# Patient Record
Sex: FEMALE | Race: WHITE
[De-identification: names, ages, dates, MRNs, and addresses within clinical notes are randomized per-mention and may not be internally consistent; named-entity substitution may affect disease eponyms.]

---

## 2018-01-23 ENCOUNTER — HOSPITAL ENCOUNTER (OUTPATIENT)
Dept: HOSPITAL 50 - VM.ED | Age: 63
Setting detail: OBSERVATION
LOS: 2 days | Discharge: NURSING, REHAB OR LONG-TERM CARE | End: 2018-01-25
Attending: NURSE PRACTITIONER | Admitting: NURSE PRACTITIONER
Payer: MEDICARE

## 2018-01-23 DIAGNOSIS — R56.9: ICD-10-CM

## 2018-01-23 DIAGNOSIS — Z87.820: ICD-10-CM

## 2018-01-23 DIAGNOSIS — R25.2: ICD-10-CM

## 2018-01-23 DIAGNOSIS — Z88.5: ICD-10-CM

## 2018-01-23 DIAGNOSIS — Z88.0: ICD-10-CM

## 2018-01-23 DIAGNOSIS — E78.00: ICD-10-CM

## 2018-01-23 DIAGNOSIS — F41.9: ICD-10-CM

## 2018-01-23 DIAGNOSIS — G89.29: ICD-10-CM

## 2018-01-23 DIAGNOSIS — M54.9: ICD-10-CM

## 2018-01-23 DIAGNOSIS — Z98.890: ICD-10-CM

## 2018-01-23 DIAGNOSIS — Z79.899: ICD-10-CM

## 2018-01-23 DIAGNOSIS — E03.9: ICD-10-CM

## 2018-01-23 DIAGNOSIS — K52.9: ICD-10-CM

## 2018-01-23 DIAGNOSIS — E86.0: Primary | ICD-10-CM

## 2018-01-23 DIAGNOSIS — Z79.810: ICD-10-CM

## 2018-01-23 LAB
CHLORIDE SERPL-SCNC: 96 MMOL/L (ref 98–107)
SODIUM SERPL-SCNC: 134 MMOL/L (ref 136–145)

## 2018-01-23 PROCEDURE — 85610 PROTHROMBIN TIME: CPT

## 2018-01-23 PROCEDURE — 83605 ASSAY OF LACTIC ACID: CPT

## 2018-01-23 PROCEDURE — G0378 HOSPITAL OBSERVATION PER HR: HCPCS

## 2018-01-23 PROCEDURE — 73060 X-RAY EXAM OF HUMERUS: CPT

## 2018-01-23 PROCEDURE — 96361 HYDRATE IV INFUSION ADD-ON: CPT

## 2018-01-23 PROCEDURE — 97535 SELF CARE MNGMENT TRAINING: CPT

## 2018-01-23 PROCEDURE — 84484 ASSAY OF TROPONIN QUANT: CPT

## 2018-01-23 PROCEDURE — 80048 BASIC METABOLIC PNL TOTAL CA: CPT

## 2018-01-23 PROCEDURE — 73502 X-RAY EXAM HIP UNI 2-3 VIEWS: CPT

## 2018-01-23 PROCEDURE — 70450 CT HEAD/BRAIN W/O DYE: CPT

## 2018-01-23 PROCEDURE — 99285 EMERGENCY DEPT VISIT HI MDM: CPT

## 2018-01-23 PROCEDURE — 97165 OT EVAL LOW COMPLEX 30 MIN: CPT

## 2018-01-23 PROCEDURE — 97161 PT EVAL LOW COMPLEX 20 MIN: CPT

## 2018-01-23 PROCEDURE — 85025 COMPLETE CBC W/AUTO DIFF WBC: CPT

## 2018-01-23 PROCEDURE — 86140 C-REACTIVE PROTEIN: CPT

## 2018-01-23 PROCEDURE — 80053 COMPREHEN METABOLIC PANEL: CPT

## 2018-01-23 PROCEDURE — 36415 COLL VENOUS BLD VENIPUNCTURE: CPT

## 2018-01-23 PROCEDURE — 96374 THER/PROPH/DIAG INJ IV PUSH: CPT

## 2018-01-23 PROCEDURE — 81001 URINALYSIS AUTO W/SCOPE: CPT

## 2018-01-23 NOTE — EDM.PDOC
ED HPI GENERAL MEDICAL PROBLEM





- General


Chief Complaint: Abdominal Pain


Stated Complaint: nausea, vomiting, weakness


Time Seen by Provider: 01/23/18 10:46


Source of Information: Reports: Patient, Other


History Limitations: Reports: Altered Mental Status





- History of Present Illness


INITIAL COMMENTS - FREE TEXT/NARRATIVE: 


Patient is resident of the [facility].  

Her staff that are here relay that over the last month her depression 

medications have been altered.  Dr. Fair is managing her medications.  She was 

exhibiting increased depression and physical aggression and he began to wean 

her from her zoloft while adding lexapro.  Once off the zoloft and on full dose 

of lexapro she was worse behaviorally and so he started her back on full dose 

of zoloft, taken off her lexapro and started on abilify.  She was started on 

abilify 5 days ago.  She became ill with nausea and vomiting so he did stop the 

abilify on Monday after she had already taken it.  She did not receive this 

medication today.  Staff noticed significant vomiting yesterday that did 

improve throughout the day, however she became more weakened and her ability 

for independent ADL's decreased.  They do note they questioned a possible TIA.  

She was not brought in yesterday.  She denies headache, chest pain, SOB, blood 

in urine or stool.  Is dry heaving today.  Staff state that she is very 

sensitive to any med changes and does have chronic back pain.


Onset: Gradual


Onset Date: 01/22/18


Duration: Intermittent


Location: Reports: Abdomen


Associated Symptoms: Reports: Nausea/Vomiting





- Related Data


 Allergies











Allergy/AdvReac Type Severity Reaction Status Date / Time


 


codeine Allergy Unknown unknown Verified 03/27/16 12:16


 


Penicillins Allergy Unknown unknown Verified 03/27/16 12:16











Home Meds: 


 Home Meds





LORazepam 1 mg PO ASDIRECTED PRN 03/23/14 [History]


Levothyroxine 175 mcg PO ACBREAKFAST 03/23/14 [History]


Multivitamin/Iron/Folic Acid [Sentry Tablet] 1 each PO DAILY 03/23/14 [History]


atorvaSTATin [Lipitor] 10 mg PO BEDTIME 03/23/14 [History]


lamoTRIgine [Lamictal] 300 mg PO BID 02/05/16 [History]


Lacosamide [Vimpat] 200 mg PO BID 03/30/16 [Rx]


ARIPiprazole [Aripiprazole] 2 mg PO DAILY 01/23/18 [History]


Calcium Carbonate/Vitamin D3 [Calcium 600 + Vit D 200] 1 tab PO BID 01/23/18 [

History]


Raloxifene [Evista] 60 mg PO DAILY 01/23/18 [History]


Ranitidine [Zantac] 75 mg PO DAILY 01/23/18 [History]


Sertraline HCl [Zoloft] 250 mg PO DAILY 01/23/18 [History]











Past Medical History


Cardiovascular History: Reports: High Cholesterol


Musculoskeletal History: Reports: Other (See Below)


Other Musculoskeletal History: right arm spascity due to accident


Neurological History: Reports: Brain Injury, Seizure


Psychiatric History: Reports: Anxiety, Other (See Below)


Other Psychiatric History: brain injury


Endocrine/Metabolic History: Reports: Hypothyroidism





Social & Family History





- Family History


Family Medical History: Noncontributory





- Tobacco Use


Smoking Status *Q: Unknown Ever Smoked


Years of Tobacco use: 20


Second Hand Smoke Exposure: No





- Alcohol Use


Days Per Week of Alcohol Use: 0





- Recreational Drug Use


Recreational Drug Use: No





ED ROS GENERAL





- Review of Systems


Review Of Systems: See Below


Constitutional: Reports: Weakness


HEENT: Reports: No Symptoms


Respiratory: Reports: No Symptoms


Cardiovascular: Reports: No Symptoms


Endocrine: Reports: No Symptoms


GI/Abdominal: Reports: Abdominal Pain, Nausea, Vomiting


: Reports: No Symptoms


Musculoskeletal: Reports: Back Pain


Skin: Reports: No Symptoms


Neurological: Reports: No Symptoms


Psychiatric: Reports: No Symptoms


Hematologic/Lymphatic: Reports: No Symptoms


Immunologic: Reports: No Symptoms





ED EXAM, GI/ABD





- Physical Exam


Exam: See Below


Exam Limited By: Altered Mental Status


General Appearance: Alert, Lethargic


Eyes: Bilateral: EOMI


Ears: Normal TMs


Nose: Normal Inspection, Normal Mucosa, No Blood


Throat/Mouth: Normal Inspection, Normal Lips, Normal Teeth, Normal Gums, Normal 

Oropharynx, Normal Voice, No Airway Compromise


Head: Atraumatic, Normocephalic, Other (evidence of history of craniotomy)


Neck: Normal Inspection, Supple, Non-Tender, Full Range of Motion


Respiratory/Chest: No Respiratory Distress, Lungs Clear, Normal Breath Sounds, 

No Accessory Muscle Use, Chest Non-Tender


Cardiovascular: Normal Peripheral Pulses, Regular Rate, Rhythm, No Edema, No 

Gallop, No JVD, No Murmur, No Rub


GI/Abdominal Exam: Normal Bowel Sounds, Soft, Non-Tender, No Organomegaly, No 

Distention, No Abnormal Bruit, No Mass, Pelvis Stable


Back Exam: Normal Inspection, Full Range of Motion, NT


Extremities: Normal Inspection, Normal Range of Motion, Non-Tender, No Pedal 

Edema, Normal Capillary Refill, Other (she does favor to right side)


Neurological: Alert, No Motor/Sensory Deficits, Memory Loss Recent Events.  No: 

Oriented


Psychiatric: Flat Affect


Skin Exam: Warm, Dry, Intact, No Rash, Ecchymosis (has had recent multiple 

falls with multiple areas of ecchymosis to right side back, shoulder, leg)


Lymphatic: No Adenopathy





Course





- Vital Signs


Last Recorded V/S: 





 Last Vital Signs











Temp  36.5 C   01/23/18 13:29


 


Pulse  68   01/23/18 13:29


 


Resp  18   01/23/18 13:29


 


BP  92/54 L  01/23/18 13:29


 


Pulse Ox  97   01/23/18 13:29














- Orders/Labs/Meds


Orders: 





 Active Orders 24 hr











 Category Date Time Status


 


 Head wo Cont [CT] Stat Exams  01/23/18 10:50 Ordered


 


 Sodium Chloride 0.9% [Saline Flush] Med  01/23/18 10:50 Ordered





 10 ml FLUSH ASDIRECTED PRN   


 


 Saline Lock Insert [OM.PC] Routine Oth  01/23/18 10:50 Ordered








 Medication Orders





Sodium Chloride (Normal Saline)  1,000 mls @ 100 mls/hr IV ASDIRECTED ROMMEL


Ondansetron HCl (Zofran Odt)  4 mg PO Q6H PRN


   PRN Reason: nausea, able to take PO


Sodium Chloride (Saline Flush)  10 ml FLUSH ASDIRECTED PRN


   PRN Reason: Keep Vein Open








Labs: 





 Laboratory Tests











  01/23/18 01/23/18 01/23/18 Range/Units





  11:35 11:35 11:35 


 


WBC  8.0    (4.0-10.0)  x10^3/uL


 


RBC  3.69 L    (4.00-5.50)  x10^6/uL


 


Hgb  11.1 L    (12.0-16.0)  g/dL


 


Hct  32.7 L    (33.0-47.0)  %


 


MCV  88.6    (78.0-93.0)  fL


 


MCH  30.1    (26.0-32.0)  pg


 


MCHC  33.9    (32.0-36.0)  g/dL


 


RDW Coeff of Pepe  13.5    (10.0-15.0)  %


 


Plt Count  327  D    (130-400)  x10^3/uL


 


Neut % (Auto)  71.2    (50.0-80.0)  %


 


Lymph % (Auto)  17.9 L    (25.0-50.0)  %


 


Mono % (Auto)  9.8    (2.0-11.0)  %


 


Eos % (Auto)  0.5    (0.0-4.0)  %


 


Baso % (Auto)  0.6    (0.2-1.2)  %


 


PT   10.1   (9.8-11.8)  SEC


 


INR   0.9 L   (2.0-3.5)  


 


Sodium    134 L  (136-145)  mmol/L


 


Potassium    3.5  (3.5-5.1)  mmol/L


 


Chloride    96 L  ()  mmol/L


 


Carbon Dioxide    28  (21-32)  mmol/L


 


BUN    15  (7-18)  mg/dL


 


Creatinine    0.9  (0.55-1.02)  mg/dL


 


Est Cr Clr Drug Dosing    TNP  


 


Estimated GFR (MDRD)    > 60  


 


Glucose    74  ()  mg/dL


 


Lactic Acid     (0.4-2.0)  mmol/L


 


Calcium    8.3 L  (8.5-10.1)  mg/dL


 


Corrected Calcium    8.70  (8.5-10.1)  mg/dL


 


Total Bilirubin    0.5  (0.2-1.0)  mg/dL


 


AST    58 H  (15-37)  U/L


 


ALT    34  (14-59)  U/L


 


Alkaline Phosphatase    97  ()  U/L


 


Troponin I    < 0.017  (<=0.056)  ng/mL


 


C-Reactive Protein    6.4 H  (<=0.9)  mg/dL


 


Total Protein    7.0  (6.4-8.2)  g/dL


 


Albumin    3.5  (3.4-5.0)  g/dL


 


Globulin    3.5  


 


Albumin/Globulin Ratio    1.00  


 


Urine Color     (YELLOW)  


 


Urine Appearance     (CLEAR)  


 


Urine pH     (5.0-8.0)  


 


Ur Specific Gravity     


 


Urine Protein     (NEGATIVE)  mg/dL


 


Urine Glucose (UA)     (NEGATIVE)  mg/dL


 


Urine Ketones     (NEGATIVE)  mg/dL


 


Urine Occult Blood     (NEGATIVE)  


 


Urine Nitrite     (NEGATIVE)  


 


Urine Bilirubin     (NEGATIVE)  


 


Urine Urobilinogen     (0.2)  EU/dL


 


Ur Leukocyte Esterase     (NEGATIVE)  


 


Urine RBC     (NOT SEEN)  /HPF


 


Urine WBC     (NOT SEEN)  /HPF


 


Ur Squamous Epith Cells     (NEGATIVE)  /HPF


 


Urine Bacteria     (NEGATIVE)  /HPF


 


Urine Mucus     (NEGATIVE)  /LPF














  01/23/18 01/23/18 Range/Units





  11:35 12:05 


 


WBC    (4.0-10.0)  x10^3/uL


 


RBC    (4.00-5.50)  x10^6/uL


 


Hgb    (12.0-16.0)  g/dL


 


Hct    (33.0-47.0)  %


 


MCV    (78.0-93.0)  fL


 


MCH    (26.0-32.0)  pg


 


MCHC    (32.0-36.0)  g/dL


 


RDW Coeff of Pepe    (10.0-15.0)  %


 


Plt Count    (130-400)  x10^3/uL


 


Neut % (Auto)    (50.0-80.0)  %


 


Lymph % (Auto)    (25.0-50.0)  %


 


Mono % (Auto)    (2.0-11.0)  %


 


Eos % (Auto)    (0.0-4.0)  %


 


Baso % (Auto)    (0.2-1.2)  %


 


PT    (9.8-11.8)  SEC


 


INR    (2.0-3.5)  


 


Sodium    (136-145)  mmol/L


 


Potassium    (3.5-5.1)  mmol/L


 


Chloride    ()  mmol/L


 


Carbon Dioxide    (21-32)  mmol/L


 


BUN    (7-18)  mg/dL


 


Creatinine    (0.55-1.02)  mg/dL


 


Est Cr Clr Drug Dosing    


 


Estimated GFR (MDRD)    


 


Glucose    ()  mg/dL


 


Lactic Acid  0.9   (0.4-2.0)  mmol/L


 


Calcium    (8.5-10.1)  mg/dL


 


Corrected Calcium    (8.5-10.1)  mg/dL


 


Total Bilirubin    (0.2-1.0)  mg/dL


 


AST    (15-37)  U/L


 


ALT    (14-59)  U/L


 


Alkaline Phosphatase    ()  U/L


 


Troponin I    (<=0.056)  ng/mL


 


C-Reactive Protein    (<=0.9)  mg/dL


 


Total Protein    (6.4-8.2)  g/dL


 


Albumin    (3.4-5.0)  g/dL


 


Globulin    


 


Albumin/Globulin Ratio    


 


Urine Color   Dark yellow H  (YELLOW)  


 


Urine Appearance   Clear  (CLEAR)  


 


Urine pH   5.5  (5.0-8.0)  


 


Ur Specific Gravity   >=1.030  


 


Urine Protein   30 H  (NEGATIVE)  mg/dL


 


Urine Glucose (UA)   Negative  (NEGATIVE)  mg/dL


 


Urine Ketones   15 H  (NEGATIVE)  mg/dL


 


Urine Occult Blood   Negative  (NEGATIVE)  


 


Urine Nitrite   Negative  (NEGATIVE)  


 


Urine Bilirubin   Small H  (NEGATIVE)  


 


Urine Urobilinogen   0.2  (0.2)  EU/dL


 


Ur Leukocyte Esterase   Negative  (NEGATIVE)  


 


Urine RBC   0-5  (NOT SEEN)  /HPF


 


Urine WBC   0-5  (NOT SEEN)  /HPF


 


Ur Squamous Epith Cells   Few H  (NEGATIVE)  /HPF


 


Urine Bacteria   Rare  (NEGATIVE)  /HPF


 


Urine Mucus   Moderate H  (NEGATIVE)  /LPF











Meds: 





Medications











Generic Name Dose Route Start Last Admin





  Trade Name Freq  PRN Reason Stop Dose Admin


 


Sodium Chloride  1,000 mls @ 100 mls/hr  01/23/18 14:30  





  Normal Saline  IV   





  ASDIRECTED ROMMEL   


 


Ondansetron HCl  4 mg  01/23/18 13:51  





  Zofran Odt  PO   





  Q6H PRN   





  nausea, able to take PO   


 


Sodium Chloride  10 ml  01/23/18 10:50  





  Saline Flush  FLUSH   





  ASDIRECTED PRN   





  Keep Vein Open   














Discontinued Medications














Generic Name Dose Route Start Last Admin





  Trade Name Freq  PRN Reason Stop Dose Admin


 


Sodium Chloride  1,000 mls @ 999 mls/hr  01/23/18 10:50  01/23/18 11:37





  Normal Saline  IV  01/23/18 11:50  999 mls/hr





  ONETIME ONE   Administration


 


Ondansetron HCl  4 mg  01/23/18 10:50  01/23/18 11:37





  Zofran  IVPUSH  01/23/18 10:51  4 mg





  ONETIME ONE   Administration














- Re-Assessments/Exams


Free Text/Narrative Re-Assessment/Exam: 





01/23/18 14:30


CT of the head does not show any indication of acute process such as ischemia 

or hemorrhage


Free Text/Narrative Re-Assessment/Exam: 





01/23/18 14:33


PLEASE USE ED HISTORY AND PHYSICAL FOR ADMISSION H & P





Departure





- Departure


Time of Disposition: 13:00


Disposition: Refer to Observation


Condition: Good


Clinical Impression: 


 Gastroenteritis, Vomiting, Dehydration, Medication course changed








- Discharge Information





ED Communication





- Discussed Case With (1)


Discussed Case With (1): Other (Visited with [facility] on-call provider Dr. Harding.  Recommend admission to observation)





- Problem List & Annotations


(1) Dehydration


SNOMED Code(s): 30177331


   Code(s): E86.0 - DEHYDRATION   Status: Acute   Current Visit: Yes   





(2) Gastroenteritis


SNOMED Code(s): 52428551


   Code(s): K52.9 - NONINFECTIVE GASTROENTERITIS AND COLITIS, UNSPECIFIED   

Status: Acute   Current Visit: Yes   





(3) Vomiting


SNOMED Code(s): 136858861


   Code(s): R11.10 - VOMITING, UNSPECIFIED   Status: Acute   Current Visit: Yes

   


Qualifiers: 


   Vomiting Intractability: non-intractable   Nausea presence: with nausea 





- Problem List Review


Problem List Initiated/Reviewed/Updated: Yes





- My Orders


Last 24 Hours: 





My Active Orders





01/23/18 10:50


Head wo Cont [CT] Stat 


Sodium Chloride 0.9% [Saline Flush]   10 ml FLUSH ASDIRECTED PRN 


Saline Lock Insert [OM.PC] Routine 














- Assessment/Plan


Last 24 Hours: 





My Active Orders





01/23/18 10:50


Head wo Cont [CT] Stat 


Sodium Chloride 0.9% [Saline Flush]   10 ml FLUSH ASDIRECTED PRN 


Saline Lock Insert [OM.PC] Routine 











Assessment:: 





dehydration


gastroenteritis


medication change reaction

## 2018-01-24 LAB
CHLORIDE SERPL-SCNC: 104 MMOL/L (ref 98–107)
SODIUM SERPL-SCNC: 140 MMOL/L (ref 136–145)

## 2018-01-24 RX ADMIN — Medication SCH MG: at 07:52

## 2018-01-24 RX ADMIN — MAGNESIUM OXIDE TAB 400 MG (241.3 MG ELEMENTAL MG) SCH TAB: 400 (241.3 MG) TAB at 07:53

## 2018-01-24 NOTE — PCM.PN
- General Info


Date of Service: 01/24/18


Admission Dx/Problem (Free Text): 





Pt. states that she is feeling better since she was admitted. She is tolerating 

fluids but continues to be quite weak, and is an assist of 2 when getting up to 

the toilet. She states that she is not experiencing any abdominal pain, fever 

or chills.


Functional Status: Reports: Pain Controlled





- Review of Systems


General: Reports: Weakness


HEENT: Reports: No Symptoms


Pulmonary: Reports: No Symptoms


Cardiovascular: Reports: No Symptoms


Gastrointestinal: Reports: Other (nausea and vomiting have improved.)


Genitourinary: Reports: No Symptoms


Musculoskeletal: Reports: No Symptoms


Skin: Reports: No Symptoms


Neurological: Reports: No Symptoms


Psychiatric: Reports: No Symptoms





- Patient Data


Vitals - Most Recent: 


 Last Vital Signs











Temp  36.3 C   01/24/18 06:00


 


Pulse  70   01/24/18 06:00


 


Resp  18   01/24/18 06:00


 


BP  96/56 L  01/24/18 06:00


 


Pulse Ox  95   01/24/18 06:00











Weight - Most Recent: 61.779 kg


I&O - Last 24 Hours: 


 Intake & Output











 01/23/18 01/24/18 01/24/18





 22:59 06:59 14:59


 


Intake Total 120 900 0


 


Balance 120 900 0











Lab Results Last 24 Hours: 


 Laboratory Results - last 24 hr











  01/24/18 01/24/18 Range/Units





  06:51 06:51 


 


WBC  7.2   (4.0-10.0)  x10^3/uL


 


RBC  3.55 L   (4.00-5.50)  x10^6/uL


 


Hgb  10.7 L   (12.0-16.0)  g/dL


 


Hct  32.4 L   (33.0-47.0)  %


 


MCV  91.3   (78.0-93.0)  fL


 


MCH  30.1   (26.0-32.0)  pg


 


MCHC  33.0   (32.0-36.0)  g/dL


 


RDW Coeff of Pepe  13.9   (10.0-15.0)  %


 


Plt Count  327   (130-400)  x10^3/uL


 


Neut % (Auto)  52.3   (50.0-80.0)  %


 


Lymph % (Auto)  35.3   (25.0-50.0)  %


 


Mono % (Auto)  8.9   (2.0-11.0)  %


 


Eos % (Auto)  2.8   (0.0-4.0)  %


 


Baso % (Auto)  0.7   (0.2-1.2)  %


 


Sodium   140  (136-145)  mmol/L


 


Potassium   4.1  (3.5-5.1)  mmol/L


 


Chloride   104  ()  mmol/L


 


Carbon Dioxide   28  (21-32)  mmol/L


 


BUN   10  (7-18)  mg/dL


 


Creatinine   1.0  (0.55-1.02)  mg/dL


 


Est Cr Clr Drug Dosing   49.49  mL/min


 


Estimated GFR (MDRD)   57  


 


Glucose   61 L  ()  mg/dL


 


Calcium   7.9 L  (8.5-10.1)  mg/dL











Med Orders - Current: 


 Current Medications





Atorvastatin Calcium (Lipitor)  10 mg PO BEDTIME [facility]


   Last Admin: 01/23/18 20:01 Dose:  10 mg


Sodium Chloride (Normal Saline)  1,000 mls @ 100 mls/hr IV ASDIRECTED [facility]


   Last Admin: 01/24/18 06:11 Dose:  100 mls/hr


Lamotrigine (Lamotrigine)  300 mg PO BID [facility]


   Last Admin: 01/24/18 07:53 Dose:  300 mg


Levothyroxine Sodium (Levothyroxine)  175 mcg PO ACBREAKFAST [facility]


   Last Admin: 01/24/18 06:15 Dose:  175 mcg


Lorazepam (Ativan)  1 mg PO ASDIRECTED PRN


   PRN Reason: Seizures


Multivitamins/Minerals (Thera M Plus)  1 tab PO DAILY [facility]


   Last Admin: 01/24/18 07:53 Dose:  1 tab


(Lacosamide [Vimpat] (200 Mg)*Pt Own Med*)  200 mg PO BID [facility]


   Last Admin: 01/24/18 07:53 Dose:  200 mg


(Raloxifene 60 Mg)* (Pt Own Med*)  60 mg PO DAILY [facility]


   Last Admin: 01/24/18 07:52 Dose:  60 mg


Ondansetron HCl (Zofran Odt)  4 mg PO Q6H PRN


   PRN Reason: nausea, able to take PO


   Last Admin: 01/23/18 20:02 Dose:  4 mg


Sertraline HCl (Zoloft)  250 mg PO DAILY [facility]


   Last Admin: 01/24/18 07:54 Dose:  250 mg


Sodium Chloride (Saline Flush)  10 ml FLUSH ASDIRECTED PRN


   PRN Reason: Keep Vein Open


   Last Admin: 01/23/18 20:03 Dose:  10 ml





Discontinued Medications





Sodium Chloride (Normal Saline)  1,000 mls @ 999 mls/hr IV ONETIME ONE


   Stop: 01/23/18 11:50


   Last Admin: 01/23/18 11:37 Dose:  999 mls/hr


Ondansetron HCl (Zofran)  4 mg IVPUSH ONETIME ONE


   Stop: 01/23/18 10:51


   Last Admin: 01/23/18 11:37 Dose:  4 mg











- Exam


General: Alert, Oriented


HEENT: Pupils Equal, Pupils Reactive, EOMI, Mucous Membr. Moist/Pink


Neck: Supple


Lungs: Clear to Auscultation, Normal Respiratory Effort


Cardiovascular: Regular Rate, Regular Rhythm


GI/Abdominal Exam: Normal Bowel Sounds, Soft, Non-Tender, No Organomegaly, No 

Distention, No Abnormal Bruit, No Mass, Pelvis Stable


Back Exam: Normal Inspection, Full Range of Motion


Extremities: Normal Inspection, Normal Range of Motion, Non-Tender, No Pedal 

Edema, Normal Capillary Refill


Skin: Warm, Dry, Intact


Neurological: No New Focal Deficit


Psy/Mental Status: Alert, Normal Affect, Normal Mood





- Problem List & Annotations


(1) Dehydration


SNOMED Code(s): 73764121


   Code(s): E86.0 - DEHYDRATION   Status: Acute   Current Visit: Yes   





(2) Gastroenteritis


SNOMED Code(s): 61696709


   Code(s): K52.9 - NONINFECTIVE GASTROENTERITIS AND COLITIS, UNSPECIFIED   

Status: Acute   Current Visit: Yes   





- Problem List Review


Problem List Initiated/Reviewed/Updated: Yes





- My Orders


Last 24 Hours: 


My Active Orders





01/24/18 09:28


Consult to  [CONS] Routine 





01/24/18 Lunch


Regular Diet [DIET] 














- Assessment


Assessment:: 





Nausea and vomiting: Gastroenteritis vs. new medication change.





- Plan


Plan:: 





Will have her followed by PT today for strengthening. I am not sure what her 

baseline is. She is very weak, and if she is not able to, we will have social 

services follow and coordinate with [facility] for placement in a different facility 

for rehabilitation.

## 2018-01-25 ENCOUNTER — HOSPITAL ENCOUNTER (INPATIENT)
Dept: HOSPITAL 50 - VM.MS | Age: 63
LOS: 18 days | Discharge: NURSING, REHAB OR LONG-TERM CARE | DRG: 948 | End: 2018-02-12
Attending: FAMILY MEDICINE | Admitting: FAMILY MEDICINE
Payer: MEDICAID

## 2018-01-25 VITALS — DIASTOLIC BLOOD PRESSURE: 69 MMHG | SYSTOLIC BLOOD PRESSURE: 130 MMHG
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
## 2018-01-25 DIAGNOSIS — Z91.81: ICD-10-CM

## 2018-01-25 DIAGNOSIS — Z88.5: ICD-10-CM

## 2018-01-25 DIAGNOSIS — Z79.899: ICD-10-CM

## 2018-01-25 DIAGNOSIS — S42.031A: ICD-10-CM

## 2018-01-25 DIAGNOSIS — M25.551: ICD-10-CM

## 2018-01-25 DIAGNOSIS — E23.0: ICD-10-CM

## 2018-01-25 DIAGNOSIS — R53.1: Primary | ICD-10-CM

## 2018-01-25 DIAGNOSIS — Z87.891: ICD-10-CM

## 2018-01-25 DIAGNOSIS — E78.5: ICD-10-CM

## 2018-01-25 DIAGNOSIS — M81.0: ICD-10-CM

## 2018-01-25 DIAGNOSIS — G40.209: ICD-10-CM

## 2018-01-25 DIAGNOSIS — S40.021D: ICD-10-CM

## 2018-01-25 DIAGNOSIS — S22.49XA: ICD-10-CM

## 2018-01-25 DIAGNOSIS — E03.9: ICD-10-CM

## 2018-01-25 DIAGNOSIS — Z88.0: ICD-10-CM

## 2018-01-25 DIAGNOSIS — F39: ICD-10-CM

## 2018-01-25 RX ADMIN — MAGNESIUM OXIDE TAB 400 MG (241.3 MG ELEMENTAL MG) SCH TAB: 400 (241.3 MG) TAB at 07:53

## 2018-01-25 RX ADMIN — Medication SCH MG: at 19:58

## 2018-01-25 RX ADMIN — Medication SCH MG: at 07:52

## 2018-01-25 RX ADMIN — Medication SCH TAB: at 20:01

## 2018-01-25 NOTE — PCM.HP
H&P History of Present Illness





- General


Date of Service: 01/25/18


Admit Problem/Dx: 


 Admission Diagnosis/Problem





Admission Diagnosis/Problem      Dehydration








Source of Information: Patient


History Limitations: Reports: Other (underlying cognitive impairments)





- History of Present Illness


Initial Comments - Free Text/Narative: 





Ms. Hernandez is a 61 yo female with chronic right sided weakness following 

resection of a pituitary macroadenoma who is admitted to swing bed for further 

management of generalized weakness. She was admitted under observation on 1/23 

due to generalized weakness and dehydration felt to be secondary to nausea and 

vomiting from some recent medication changes. She lives at the [facility] and has recently had some increased behavior issues. Therefore, her 

psychiatric medications have been adjusted. Shortly after starting abilify the 

end of last week, she developed a significant decrease in appetite as well as 

some nausea, vomiting, and fatigue. The abilify was stopped early this week and 

the GI symptoms resolved. However, she remained significantly weak. Following a 

fall, she was brought to the ER for evaluation and was subsequently admitted 

under observation for IV fluids. Although she is now eating and drinking well 

without any issues of nausea or vomiting, her strength is not back to baseline. 

She needs to be able to walk independently to return to the ran so she is 

admitted to swing bed for strengthening.





She has had some right hip pain this morning. She also has some right upper arm 

pain as well. She has bruising along the upper arm but not along the hip. The 

details of her fall are not available at the time of my interview.





- Related Data


Allergies/Adverse Reactions: 


 Allergies











Allergy/AdvReac Type Severity Reaction Status Date / Time


 


codeine Allergy Unknown unknown Verified 03/27/16 12:16


 


Penicillins Allergy Unknown unknown Verified 03/27/16 12:16











Home Medications: 


 Home Meds





LORazepam 1 mg PO ASDIRECTED PRN 03/23/14 [History]


Levothyroxine 175 mcg PO ACBREAKFAST 03/23/14 [History]


Multivitamin/Iron/Folic Acid [Sentry Tablet] 1 tab PO DAILY 03/23/14 [History]


atorvaSTATin [Lipitor] 10 mg PO BEDTIME 03/23/14 [History]


lamoTRIgine [Lamictal] 300 mg PO BID 02/05/16 [History]


Lacosamide [Vimpat] 200 mg PO BID 03/30/16 [Rx]


Calcium Carbonate/Vitamin D3 [Calcium 600 + Vit D 200] 1 tab PO BID 01/23/18 [

History]


Raloxifene [Evista] 60 mg PO DAILY 01/23/18 [History]


Ranitidine [Zantac] 75 mg PO DAILY 01/23/18 [History]


Sertraline HCl [Zoloft] 250 mg PO DAILY 01/23/18 [History]











Past Medical History


HEENT History: Reports: None


Cardiovascular History: Reports: High Cholesterol


Respiratory History: Reports: None


Gastrointestinal History: Reports: None


Genitourinary History: Reports: None


Musculoskeletal History: Reports: Osteoporosis, Other (See Below)


Other Musculoskeletal History: right arm spascity due to accident


Neurological History: Reports: Brain Injury, Seizure


Psychiatric History: Reports: Anxiety, Other (See Below)


Other Psychiatric History: brain injury


Endocrine/Metabolic History: Reports: Hypothyroidism, Other (See Below) (

pituitary macroadenoma, resected panhypopituitarism)


Hematologic History: Reports: None


Immunologic History: Reports: None


Oncologic (Cancer) History: Reports: None


Dermatologic History: Reports: None





- Infectious Disease History


Infectious Disease History: Reports: None





- Past Surgical History


HEENT Surgical History: Reports: Other (See Below) (excision of vocal cord 

nodule)


GI Surgical History: Reports: Appendectomy, Cholecystectomy





Social & Family History





- Family History


Cardiac: Reports: CAD, High Cholesterol, Hypertension





- Tobacco Use


Smoking Status *Q: Former Smoker


Years of Tobacco use: 20


Second Hand Smoke Exposure: No





- Alcohol Use


Alcohol Use History: No


Days Per Week of Alcohol Use: 0


Alcohol Use in Last Twelve Months: No





- Recreational Drug Use


Recreational Drug Use: No





- Living Situation & Occupation


Living situation: Reports: Single, Other ([place] [facility])


Occupation: Disabled





H&P Review of Systems





- Review of Systems:


Review Of Systems: See Below


General: Reports: No Symptoms


HEENT: Reports: No Symptoms


Pulmonary: Reports: No Symptoms


Cardiovascular: Reports: No Symptoms


Gastrointestinal: Reports: No Symptoms


Genitourinary: Reports: No Symptoms


Skin: Reports: No Symptoms


Psychiatric: Reports: No Symptoms


Neurological: Reports: Pre-Existing Deficit





Exam





- Exam


Exam: See Below





- Exam


General: Alert, Cooperative, Mild Distress (intermittently secondary to right 

hip pain)


HEENT: Conjunctiva Clear, Mucosa Moist & Pink, Pupils Equal, Pupils Reactive


Neck: Supple, Trachea Midline.  No: Lymphadenopathy, Thyromegaly


Lungs: Clear to Auscultation, Normal Respiratory Effort


Cardiovascular: Regular Rate, Regular Rhythm, Normal S1, Normal S2


GI/Abdominal Exam: Normal Bowel Sounds, Soft, Non-Tender, No Organomegaly, No 

Distention, No Mass


Extremities: Normal Inspection, No Pedal Edema, Other (tenderness to palpation 

anterior upper leg; no pain with passive internal and external rotation at the 

hip; no erythema, edema, or ecchymosis; right upper arm with resolving 

ecchymosis - area is mildly tender to palpation - ROM presumably at baseline)


Peripheral Pulses: 2+: Radial (L), Radial (R), Posterior Tibial (L), Posterior 

Tibial (R)


Skin: Warm, Dry, Intact





*Q Meaningful Use (ADM)





- VTE *Q


VTE Criteria *Q: 








- Stroke *Q


Stroke Criteria *Q: 








- AMI *Q


AMI Criteria *Q: 








- Problem List


(1) Generalized weakness


SNOMED Code(s): 65448081


   ICD Code: R53.1 - WEAKNESS   Status: Acute   Current Visit: Yes   Problem 

Details: - Secondary to nausea, vomiting, and dehydration. Evaluation for other 

causes appropriate and without other explanation.


- PT and OT to work with the patient.   





(2) Contusion, hip


SNOMED Code(s): 99013797


   ICD Code: S70.00XA - CONTUSION OF UNSPECIFIED HIP, INITIAL ENCOUNTER   Status

: Acute   Current Visit: Yes   Problem Details: - X-rays obtained today and 

negative.


- Will add tylenol PRN for pain.


- If unable to bear weight and/or pain is not controlled with tylenol, will 

consider further evaluation with CT scan.   


Qualifiers: 


   Encounter type: initial encounter   Laterality: right   Qualified Code(s): 

S70.01XA - Contusion of right hip, initial encounter   





(3) Arm contusion


SNOMED Code(s): 66084358


   ICD Code: S40.029A - CONTUSION OF UNSPECIFIED UPPER ARM, INITIAL ENCOUNTER   

Status: Acute   Current Visit: Yes   Problem Details: - X-rays obtained today 

and negative.


- Will monitor for any change in symptoms.   


Qualifiers: 


   Encounter type: initial encounter   Laterality: right   Qualified Code(s): 

S40.021A - Contusion of right upper arm, initial encounter   





(4) Dehydration


SNOMED Code(s): 65004598


   ICD Code: E86.0 - DEHYDRATION   Status: Resolved   Current Visit: No   

Problem Details: - Was dehydrated upon observation admission. This is now 

resolved.


- No further IV fluids.


- She will ad eric PO intake.   





(5) Complex partial seizure


SNOMED Code(s): 345640835


   ICD Code: G40.209 - LOCAL-REL SYMPTC EPI W CMPLX PRT SEIZ,NOT NTRCT,W/O STAT 

EPI   Status: Chronic   Current Visit: No   Problem Details: - No known recent 

seizures.


- Continue home medications, including PRN lorazepam.   


Qualifiers: 


   Epilepsy type: partial symptomatic   Intractability: not intractable   

Status epilepticus: without status epilepticus   Qualified Code(s): G40.209 - 

Localization-related (focal) (partial) symptomatic epilepsy and epileptic 

syndromes with complex partial seizures, not intractable, without status 

epilepticus   





(6) Hyperlipidemia


SNOMED Code(s): 24700827


   ICD Code: E78.5 - HYPERLIPIDEMIA, UNSPECIFIED   Status: Chronic   Current 

Visit: No   Problem Details: - Continue home medications.   


Qualifiers: 


   Hyperlipidemia type: unspecified   Qualified Code(s): E78.5 - Hyperlipidemia

, unspecified   





(7) Hypothyroidism


SNOMED Code(s): 99666836


   ICD Code: E03.9 - HYPOTHYROIDISM, UNSPECIFIED   Status: Chronic   Current 

Visit: No   Problem Details: - TSH ok 1/13/18.


- Continue home medications.   


Qualifiers: 


   Hypothyroidism type: acquired   Qualified Code(s): E03.9 - Hypothyroidism, 

unspecified   





(8) Mood disorder


Status: Chronic   Current Visit: No   Problem Details: - Seems to be doing fine 

off the abilify at this point.


- Continue home medications.   





(9) Osteoporosis


SNOMED Code(s): 38046097


   ICD Code: M81.0 - AGE-RELATED OSTEOPOROSIS W/O CURRENT PATHOLOGICAL FRACTURE

   Status: Chronic   Current Visit: No   Problem Details: - Continue evista.   


Qualifiers: 


   Osteoporosis type: unspecified   Presence of current pathological fracture: 

without current pathological fracture   Qualified Code(s): M81.0 - Age-related 

osteoporosis without current pathological fracture   





(10) Panhypopituitarism


SNOMED Code(s): 21320450


   ICD Code: E23.0 - HYPOPITUITARISM   Status: Chronic   Current Visit: No   

Problem Details: - On problem list but only receiving replacement for thyroid.


- Continue home medications.   


Problem List Initiated/Reviewed/Updated: Yes


Orders Last 24hrs: 


 Active Orders 24 hr











 Category Date Time Status


 


 Patient Status [ADT] Routine ADT  01/25/18 11:45 Active


 


 Antiembolic Devices [RC] PER UNIT ROUTINE Care  01/25/18 13:10 Ordered


 


 Notify Provider Vital Signs [RC] ASDIRECTED Care  01/25/18 13:09 Ordered


 


 Oxygen Therapy [RC] PRN Care  01/25/18 13:06 Ordered


 


 Up With Assistance [RC] ASDIRECTED Care  01/25/18 13:06 Ordered


 


 VTE/DVT Education [RC] PER UNIT ROUTINE Care  01/25/18 13:06 Ordered


 


 Vital Signs [RC] PER UNIT ROUTINE Care  01/25/18 13:06 Ordered


 


 OT Evaluation and Treatment [CONS] Routine Cons  01/25/18 13:06 Ordered


 


 PT Evaluation and Treatment [CONS] Routine Cons  01/25/18 13:06 Ordered


 


 Regular Diet [DIET] Diet  01/25/18 Dinner Ordered


 


 Acetaminophen [Tylenol] Med  01/25/18 13:18 Ordered





 650 mg PO Q6H PRN   


 


 Calcium Carbonate/Vitamin D3 Med  01/25/18 20:00 Ordered





 1 tab PO BID   


 


 LORazepam [Ativan] Med  01/25/18 13:11 Ordered





 1 mg PO ASDIRECTED PRN   


 


 Lacosamide [Vimpat] Med  01/25/18 20:00 Ordered





 200 mg PO BID   


 


 Levothyroxine Med  01/26/18 07:00 Ordered





 175 mcg PO ACBREAKFAST   


 


 Multivitamin/Iron/Folic Acid [Sentry Tablet] Med  01/26/18 08:00 Ordered





 1 tab PO DAILY   


 


 Raloxifene Med  01/26/18 08:00 Ordered





 60 mg PO DAILY   


 


 Ranitidine [Zantac] Med  01/26/18 08:00 Ordered





 75 mg PO DAILY   


 


 Sertraline [Zoloft] Med  01/26/18 08:00 Ordered





 250 mg PO DAILY   


 


 atorvaSTATin [Lipitor] Med  01/25/18 20:00 Ordered





 10 mg PO BEDTIME   


 


 lamoTRIgine [Lamictal] Med  01/25/18 20:00 Ordered





 300 mg PO BID   


 


 Sequential Compression Device [OM.PC] Routine Oth  01/25/18 13:06 Ordered


 


 Resuscitation Status Routine Resus Stat  01/25/18 13:06 Ordered








 Medication Orders





Acetaminophen (Tylenol)  650 mg PO Q6H PRN


   PRN Reason: Pain


Atorvastatin Calcium (Lipitor)  10 mg PO BEDTIME [facility]


Levothyroxine Sodium (Levothyroxine)  175 mcg PO ACBREAKFAST [facility]


Lorazepam (Ativan)  1 mg PO ASDIRECTED PRN


   PRN Reason: Seizures


Non-Formulary Medication (Calcium Carbonate/Vitamin D3)  1 tab PO BID [facility]


Non-Formulary Medication (Lacosamide [Vimpat])  200 mg PO BID [facility]


Non-Formulary Medication (Lamotrigine [Lamictal])  300 mg PO BID [facility]


Non-Formulary Medication (Multivitamin/Iron/Folic Acid [Sentry Tablet])  1 tab 

PO DAILY [facility]


Non-Formulary Medication (Raloxifene)  60 mg PO DAILY [facility]


Non-Formulary Medication (Ranitidine [Zantac])  75 mg PO DAILY [facility]


Sertraline HCl (Zoloft)  250 mg PO DAILY [facility]








Assessment/Plan Comment:: 





61 yo female who is admitted to swing bed for strengthening following an acute 

stay due to dehydration secondary to nausea and vomiting with medication 

changes. She is improving nicely. PT and OT consults for strengthening. 

Continue home medications. X-rays negative for pain complaints; will add 

tylenol PRN. She will have SCD's for VTE prophylaxis as she will otherwise be 

active working with PT so no need for pharmacologic VTE prophylaxis. Duration 

of stay will be up to therapy. I am not familiar with her baseline decision 

making and am unsure if she has any POA or guardian. Therefore, will make her 

full code at this time. Can be readdressed by her PCP if he is more familiar 

with her long term situation.

## 2018-01-25 NOTE — PCM.SN
- Free Text/Narrative


Note: 





Called by nursing staff regarding CT results. Patient does have fractures in 

ribs 1-4 as well as the distal clavicle fracture. Reviewed up to date and other 

resources. No further intervention is needed since she has no evidence of any 

internal pathology (mediastinal injury, pneumothorax, etc). No change to the 

plan outlined previously. Will reassess patient in the am.

## 2018-01-25 NOTE — PCM.[email or web address]
**Discharge Summary





- Hospital Course


Free Text/Narrative:: 





Pt. is being transitioned to swing bed. She is currently feeling much better 

today and appears less fatigued. She is, however, complaining of R arm pain and 

R hip pain post fall. These areas have not been imaged but will be today.





- Discharge Data


Discharge Date: 01/25/18


Discharge Disposition: DC/Tfer W/I Hosp To Swing 61


Condition: Good





- Discharge Diagnosis/Problem(s)


(1) Dehydration


SNOMED Code(s): 11160187


   ICD Code: E86.0 - DEHYDRATION   Status: Acute   Current Visit: Yes   





(2) Gastroenteritis


SNOMED Code(s): 14850457


   ICD Code: K52.9 - NONINFECTIVE GASTROENTERITIS AND COLITIS, UNSPECIFIED   

Status: Acute   Current Visit: Yes   





- Patient Summary/Data


Consults: 


 Consultations





01/23/18 17:39


PT Evaluation and Treatment [CONS] Routine 





01/23/18 18:07


Consult to Occupational Therapy [OT Evaluation and Treatment] [CONS] Routine 





01/24/18 09:28


Consult to  [CONS] Routine 














- Discharge Plan


Home Medications: 


 Home Meds





LORazepam 1 mg PO ASDIRECTED PRN 03/23/14 [History]


Levothyroxine 175 mcg PO ACBREAKFAST 03/23/14 [History]


Multivitamin/Iron/Folic Acid [Sentry Tablet] 1 tab PO DAILY 03/23/14 [History]


atorvaSTATin [Lipitor] 10 mg PO BEDTIME 03/23/14 [History]


lamoTRIgine [Lamictal] 300 mg PO BID 02/05/16 [History]


Lacosamide [Vimpat] 200 mg PO BID 03/30/16 [Rx]


ARIPiprazole [Aripiprazole] 2 mg PO DAILY 01/23/18 [History]


Calcium Carbonate/Vitamin D3 [Calcium 600 + Vit D 200] 1 tab PO BID 01/23/18 [

History]


Raloxifene [Evista] 60 mg PO DAILY 01/23/18 [History]


Ranitidine [Zantac] 75 mg PO DAILY 01/23/18 [History]


Sertraline HCl [Zoloft] 250 mg PO DAILY 01/23/18 [History]








Forms:  ED Department Discharge


Referrals: 


Juan Jeffrey MD [Primary Care Provider] - 





- General Info


Date of Service: 01/25/18


Admission Dx/Problem (Free Text: 





dehydration





Functional Status: Reports: Pain Controlled, Tolerating Diet





- Review of Systems


General: Reports: No Symptoms


HEENT: Reports: No Symptoms


Pulmonary: Reports: No Symptoms


Cardiovascular: Reports: No Symptoms


Gastrointestinal: Reports: No Symptoms


Genitourinary: Reports: No Symptoms


Musculoskeletal: Reports: Arm Pain, Other (R hip pain)


Skin: Reports: No Symptoms


Neurological: Reports: No Symptoms


Psychiatric: Reports: No Symptoms





- Patient Data


Vitals - Most Recent: 


 Last Vital Signs











Temp  36.8 C   01/25/18 06:00


 


Pulse  81   01/25/18 06:00


 


Resp  20   01/25/18 06:00


 


BP  113/81   01/25/18 06:00


 


Pulse Ox  95   01/25/18 06:00











Weight - Most Recent: 61.779 kg


I&O - Last 24 hours: 


 Intake & Output











 01/24/18 01/25/18 01/25/18





 22:59 06:59 14:59


 


Intake Total  600 300


 


Output Total  1800 600


 


Balance  -1200 -300











Med Orders - Current: 


 Current Medications





Atorvastatin Calcium (Lipitor)  10 mg PO BEDTIME [facility]


   Last Admin: 01/24/18 20:19 Dose:  10 mg


Sodium Chloride (Normal Saline)  1,000 mls @ 100 mls/hr IV ASDIRECTED [facility]


   Last Admin: 01/25/18 04:31 Dose:  100 mls/hr


Lamotrigine (Lamotrigine)  300 mg PO BID [facility]


   Last Admin: 01/25/18 07:52 Dose:  300 mg


Levothyroxine Sodium (Levothyroxine)  175 mcg PO ACBREAKFAST [facility]


   Last Admin: 01/25/18 06:35 Dose:  175 mcg


Lorazepam (Ativan)  1 mg PO ASDIRECTED PRN


   PRN Reason: Seizures


Multivitamins/Minerals (Thera M Plus)  1 tab PO DAILY [facility]


   Last Admin: 01/25/18 07:53 Dose:  1 tab


(Lacosamide [Vimpat] (200 Mg)*Pt Own Med*)  200 mg PO BID [facility]


   Last Admin: 01/25/18 07:51 Dose:  200 mg


(Raloxifene 60 Mg)* (Pt Own Med*)  60 mg PO DAILY [facility]


   Last Admin: 01/25/18 07:52 Dose:  60 mg


Ondansetron HCl (Zofran Odt)  4 mg PO Q6H PRN


   PRN Reason: nausea, able to take PO


   Last Admin: 01/23/18 20:02 Dose:  4 mg


Sertraline HCl (Zoloft)  250 mg PO DAILY [facility]


   Last Admin: 01/25/18 07:53 Dose:  250 mg


Sodium Chloride (Saline Flush)  10 ml FLUSH ASDIRECTED PRN


   PRN Reason: Keep Vein Open


   Last Admin: 01/23/18 20:03 Dose:  10 ml





Discontinued Medications





Sodium Chloride (Normal Saline)  1,000 mls @ 999 mls/hr IV ONETIME ONE


   Stop: 01/23/18 11:50


   Last Admin: 01/23/18 11:37 Dose:  999 mls/hr


Ondansetron HCl (Zofran)  4 mg IVPUSH ONETIME ONE


   Stop: 01/23/18 10:51


   Last Admin: 01/23/18 11:37 Dose:  4 mg











- Exam


Quality Assessment: Reports: Supplemental Oxygen


General: Reports: Alert, Oriented


HEENT: Reports: Pupils Equal, Pupils Reactive, EOMI, Mucous Membr. Moist/Pink


Neck: Reports: Supple


Lungs: Reports: Clear to Auscultation, Normal Respiratory Effort


Cardiovascular: Reports: Regular Rate, Regular Rhythm


GI/Abdominal Exam: Normal Bowel Sounds, Soft, Non-Tender, No Organomegaly, No 

Distention, No Abnormal Bruit, No Mass, Pelvis Stable


 (Female) Exam: Deferred


Rectal (Female) Exam: Deferred


Back Exam: Reports: Normal Inspection, Decreased Range of Motion


Extremities: Normal Inspection, Normal Range of Motion, No Pedal Edema, Arm 

Pain (R arm pain), Leg Pain (R hip)


Skin: Reports: Warm, Dry, Intact


Neurological: Reports: No New Focal Deficit


Psy/Mental Status: Reports: Alert, Normal Affect, Normal Mood





*Q Meaningful Use (DIS)





- VTE *Q


VTE Criteria *Q: 





VTE Anticoagulation Contraindications: Treatment Not Tolerated





- Stroke *Q


Stroke Criteria *Q: 








- AMI *Q


AMI Criteria *Q:

## 2018-01-25 NOTE — PCM.SN
- Free Text/Narrative


Note: 





Called by nursing regarding radiology read of multiple fractures (likely some 

acute, some chronic) in the 2nd and 3rd ribs as well as a distal clavicle 

fracture. Patient remains asymptomatic and without chest pain or shortness of 

breath per nursing report. Fall occurred prior to admission and she was well 

appearing this am on my exam. Will get a CT chest wo contrast to clarify extent 

of rib fractures and r/o pneumothorax. Will also have her do IS every hour 

while awake. Pain control with tylenol as this has been effective. Sling for 

right arm for the clavicle fracture. Patient will remain on swing bed as it is 

unlikely any intervention will be required for these findings. Will do tertiary 

survey tomorrow.

## 2018-01-26 RX ADMIN — SERTRALINE HYDROCHLORIDE SCH MG: 50 TABLET ORAL at 08:34

## 2018-01-26 RX ADMIN — Medication SCH TAB: at 08:33

## 2018-01-26 RX ADMIN — Medication SCH MG: at 08:34

## 2018-01-26 RX ADMIN — Medication SCH MG: at 08:35

## 2018-01-26 RX ADMIN — SERTRALINE HYDROCHLORIDE SCH MG: 100 TABLET ORAL at 08:34

## 2018-01-26 RX ADMIN — Medication SCH: at 20:05

## 2018-01-26 RX ADMIN — Medication SCH MG: at 20:04

## 2018-01-26 RX ADMIN — Medication SCH MG: at 20:05

## 2018-01-26 RX ADMIN — Medication SCH MG: at 08:33

## 2018-01-26 RX ADMIN — Medication SCH TAB: at 20:05

## 2018-01-26 NOTE — PCM.PN
- General Info


Date of Service: 01/26/18


Subjective Update: 





Patient is doing well this morning. Denies any ongoing hip or arm pain. Tylenol 

is working well. Denies any other areas of pain nor any other complaints.





- Patient Data


Vitals - Most Recent: 


 Last Vital Signs











Temp  36.8 C   01/26/18 05:23


 


Pulse  43 L  01/26/18 05:23


 


Resp  19   01/26/18 05:23


 


BP  112/66   01/26/18 05:23


 


Pulse Ox  93 L  01/26/18 05:23











I&O - Last 24 Hours: 


 Intake & Output











 01/25/18 01/26/18 01/26/18





 22:59 06:59 14:59


 


Intake Total 860 720 120


 


Output Total 600 1800 


 


Balance 260 -1080 120











Med Orders - Current: 


 Current Medications





Acetaminophen (Tylenol)  650 mg PO Q6H PRN


   PRN Reason: Pain


   Last Admin: 01/26/18 08:36 Dose:  650 mg


Atorvastatin Calcium (Lipitor)  10 mg PO BEDTIME [facility]


   Last Admin: 01/25/18 19:59 Dose:  10 mg


Calcium Carbonate (Calcium Carbonate/Vitamin D 1250 Mg-200 Unit)  1 tab PO BID 

[facility]


   Last Admin: 01/26/18 08:33 Dose:  1 tab


Levothyroxine Sodium (Levothyroxine)  175 mcg PO ACBREAKFAST [facility]


   Last Admin: 01/26/18 06:09 Dose:  175 mcg


Lorazepam (Ativan)  1 mg PO ASDIRECTED PRN


   PRN Reason: Seizures


Lacosamide [Vimpat] (200mg (Own Supply))  0 mg PO BID [facility]


   Last Admin: 01/26/18 08:33 Dose:  200 mg


Lamotrigine [ Lamictal] 300mg (Own Supply)  0 mg PO BID [facility]


   Last Admin: 01/26/18 08:35 Dose:  300 mg


Multivitamin/Iron/Folic Acid [Sentry Tablet] Own Supply  1 tab PO DAILY [facility]


   Last Admin: 01/26/18 08:33 Dose:  1 tab


Raloxifene 60 Mg ( (Own Supply))  0 mg PO DAILY [facility]


   Last Admin: 01/26/18 08:34 Dose:  60 mg


Ranitidine [Zantac] (75mg (Own Supply))  0 mg PO DAILY [facility]


   Last Admin: 01/26/18 08:34 Dose:  75 mg


Sertraline HCl (Zoloft)  200 mg PO DAILY [facility]


   Last Admin: 01/26/18 08:34 Dose:  200 mg


Sertraline HCl (Zoloft)  50 mg PO DAILY [facility]


   Last Admin: 01/26/18 08:34 Dose:  50 mg











- Exam


General: Alert, Cooperative, No Acute Distress


HEENT: Other (head is normocephalic and atraumatic)


Neck: Supple, Other (No midline or paraspinous muscle tenderness to palpation. 

Normal ROM without stiffness or pain.)


GI/Abdominal Exam: Normal Bowel Sounds, Soft, Non-Tender, No Distention, Pelvis 

Stable


Extremities: Other (No tenderness to palpation over any part of the extremities 

apart from the upper right arm as documented yesterday. No chest wall 

tenderness to palpation.)





- Problem List & Annotations


(1) Rib fractures


SNOMED Code(s): 50155415


   Code(s): S22.39XA - FRACTURE OF ONE RIB, UNSP SIDE, INIT FOR CLOS FX   Status

: Acute   Current Visit: Yes   


Qualifiers: 


   Encounter type: initial encounter   Rib fracture type: multiple ribs   

Fracture type: closed   Laterality: right   Qualified Code(s): S22.41XA - 

Multiple fractures of ribs, right side, initial encounter for closed fracture   


Annotation/Comment:: - Patient has multiple rib fractures on the right. Ribs 1-

4 is a high risk area but there was no evidence of any internal injury on CT 

scan.


- Tertiary survey negative today besides previously identified injuries.


- IS q1 hour while awake.


- Pain is controlled with tylenol.


- Reassess for any change in status.   





(2) Clavicle fracture


SNOMED Code(s): 16810630


   Code(s): S42.009A - FRACTURE OF UNSP PART OF UNSP CLAVICLE, INIT FOR CLOS FX

   Status: Acute   Current Visit: Yes   


Qualifiers: 


   Encounter type: initial encounter   Clavicle location: lateral end   

Fracture type: closed   Fracture alignment: nondisplaced   Laterality: right   

Qualified Code(s): S42.034A - Nondisplaced fracture of lateral end of right 

clavicle, initial encounter for closed fracture   


Annotation/Comment:: - Distal clavicle fractures are treated nonoperatively.


- Sling to be placed and worn consistently for 2 weeks, then weaned as 

tolerated.


- Pain is controlled with tylenol.   





- Problem List Review


Problem List Initiated/Reviewed/Updated: Yes





- My Orders


Last 24 Hours: 


My Active Orders





01/25/18 11:45


Patient Status [ADT] Routine 





01/25/18 13:06


Oxygen Therapy [RC] .PRN 


Up With Assistance [RC] ASDIRECTED 


VTE/DVT Education [RC] PER UNIT ROUTINE 


Vital Signs [RC] PER UNIT ROUTINE 


OT Evaluation and Treatment [CONS] Routine 


PT Evaluation and Treatment [CONS] Routine 


Sequential Compression Device [OM.PC] Routine 


Resuscitation Status Routine 





01/25/18 13:09


Notify Provider Vital Signs [RC] 06,18 01/25/18 13:10


Antiembolic Devices [RC] 08,20 01/25/18 13:11


LORazepam [Ativan]   1 mg PO ASDIRECTED PRN 





01/25/18 13:18


Acetaminophen [Tylenol]   650 mg PO Q6H PRN 





01/25/18 20:00


Calcium Carbonate/Vitamin D3 [Calcium Carbonate/Vitamin D 1250 MG-200 Unit]   1 

tab PO BID 


Lacosamide [Vimpat]   0 mg PO BID 


atorvaSTATin [Lipitor]   10 mg PO BEDTIME 


lamoTRIgine [Lamictal]   0 mg PO BID 





01/25/18 20:09


Chest wo Cont [CT] Stat 





01/25/18 20:14


Incentive Spirometry [RT Incentive Spirometry] [RC] Q1HWA 





01/25/18 21:53


Communication Order [RC] DAILY 





01/25/18 21:55


Communication Order [RC] 08 01/25/18 Dinner


Regular Diet [DIET] 





01/26/18 07:00


Levothyroxine   175 mcg PO ACBREAKFAST 





01/26/18 08:00


Multivitamin/Iron/Folic Acid [Sentry Tablet]   1 tab PO DAILY 


Raloxifene   0 mg PO DAILY 


Ranitidine [Zantac]   0 mg PO DAILY 


Sertraline [Zoloft]   200 mg PO DAILY 


Sertraline [Zoloft]   50 mg PO DAILY 














- Assessment


Assessment:: 





63 yo female seen today for reassessment after diagnosis of ribs and clavicle 

fractures last night. Doing well this am. Tertiary survey negative.





- Plan


Plan:: 





See details as above.

## 2018-01-27 RX ADMIN — Medication SCH MG: at 20:37

## 2018-01-27 RX ADMIN — Medication SCH MG: at 09:00

## 2018-01-27 RX ADMIN — Medication SCH MG: at 09:01

## 2018-01-27 RX ADMIN — Medication SCH MG: at 08:59

## 2018-01-27 RX ADMIN — Medication SCH MG: at 20:38

## 2018-01-27 RX ADMIN — Medication SCH TAB: at 08:59

## 2018-01-27 RX ADMIN — SERTRALINE HYDROCHLORIDE SCH MG: 50 TABLET ORAL at 09:00

## 2018-01-27 RX ADMIN — SERTRALINE HYDROCHLORIDE SCH MG: 100 TABLET ORAL at 09:00

## 2018-01-27 RX ADMIN — Medication SCH TAB: at 20:37

## 2018-01-28 RX ADMIN — Medication SCH TAB: at 07:54

## 2018-01-28 RX ADMIN — Medication SCH TAB: at 20:29

## 2018-01-28 RX ADMIN — SERTRALINE HYDROCHLORIDE SCH MG: 50 TABLET ORAL at 07:53

## 2018-01-28 RX ADMIN — Medication SCH MG: at 07:53

## 2018-01-28 RX ADMIN — SERTRALINE HYDROCHLORIDE SCH MG: 100 TABLET ORAL at 07:53

## 2018-01-28 RX ADMIN — Medication SCH MG: at 20:28

## 2018-01-28 RX ADMIN — Medication SCH TAB: at 07:53

## 2018-01-29 RX ADMIN — Medication SCH TAB: at 08:22

## 2018-01-29 RX ADMIN — SERTRALINE HYDROCHLORIDE SCH MG: 50 TABLET ORAL at 05:27

## 2018-01-29 RX ADMIN — SERTRALINE HYDROCHLORIDE SCH MG: 100 TABLET ORAL at 08:23

## 2018-01-29 RX ADMIN — Medication SCH MG: at 08:24

## 2018-01-29 RX ADMIN — Medication SCH MG: at 08:23

## 2018-01-29 RX ADMIN — Medication SCH MG: at 20:09

## 2018-01-29 RX ADMIN — Medication SCH MG: at 08:25

## 2018-01-29 RX ADMIN — Medication SCH TAB: at 08:24

## 2018-01-29 RX ADMIN — Medication SCH TAB: at 20:09

## 2018-01-29 RX ADMIN — SERTRALINE HYDROCHLORIDE SCH MG: 50 TABLET ORAL at 08:26

## 2018-01-30 RX ADMIN — Medication SCH TAB: at 19:44

## 2018-01-30 RX ADMIN — Medication SCH MG: at 08:34

## 2018-01-30 RX ADMIN — Medication SCH MG: at 06:17

## 2018-01-30 RX ADMIN — Medication SCH TAB: at 08:33

## 2018-01-30 RX ADMIN — Medication SCH MG: at 08:35

## 2018-01-30 RX ADMIN — SERTRALINE HYDROCHLORIDE SCH MG: 100 TABLET ORAL at 08:34

## 2018-01-30 RX ADMIN — Medication SCH MG: at 08:33

## 2018-01-30 RX ADMIN — Medication SCH MG: at 19:41

## 2018-01-30 RX ADMIN — SERTRALINE HYDROCHLORIDE SCH MG: 50 TABLET ORAL at 08:36

## 2018-01-31 RX ADMIN — Medication SCH TAB: at 07:57

## 2018-01-31 RX ADMIN — SERTRALINE HYDROCHLORIDE SCH MG: 50 TABLET ORAL at 07:56

## 2018-01-31 RX ADMIN — Medication SCH MG: at 09:15

## 2018-01-31 RX ADMIN — Medication SCH MG: at 06:27

## 2018-01-31 RX ADMIN — Medication SCH TAB: at 20:12

## 2018-01-31 RX ADMIN — SERTRALINE HYDROCHLORIDE SCH MG: 100 TABLET ORAL at 07:56

## 2018-01-31 RX ADMIN — Medication SCH MG: at 20:12

## 2018-01-31 RX ADMIN — Medication SCH MG: at 07:57

## 2018-01-31 RX ADMIN — Medication SCH TAB: at 07:56

## 2018-01-31 RX ADMIN — Medication SCH MG: at 20:13

## 2018-02-01 RX ADMIN — SERTRALINE HYDROCHLORIDE SCH MG: 50 TABLET ORAL at 08:38

## 2018-02-01 RX ADMIN — Medication SCH TAB: at 08:37

## 2018-02-01 RX ADMIN — Medication SCH MG: at 19:29

## 2018-02-01 RX ADMIN — Medication SCH MG: at 06:31

## 2018-02-01 RX ADMIN — Medication SCH MG: at 08:38

## 2018-02-01 RX ADMIN — Medication SCH TAB: at 19:29

## 2018-02-01 RX ADMIN — SERTRALINE HYDROCHLORIDE SCH MG: 100 TABLET ORAL at 08:38

## 2018-02-01 RX ADMIN — Medication SCH MG: at 08:37

## 2018-02-02 RX ADMIN — Medication SCH MG: at 20:35

## 2018-02-02 RX ADMIN — SERTRALINE HYDROCHLORIDE SCH MG: 100 TABLET ORAL at 08:21

## 2018-02-02 RX ADMIN — Medication SCH TAB: at 08:20

## 2018-02-02 RX ADMIN — Medication SCH MG: at 08:20

## 2018-02-02 RX ADMIN — Medication SCH MG: at 20:34

## 2018-02-02 RX ADMIN — Medication SCH MG: at 06:10

## 2018-02-02 RX ADMIN — SERTRALINE HYDROCHLORIDE SCH MG: 50 TABLET ORAL at 08:22

## 2018-02-02 RX ADMIN — Medication SCH TAB: at 20:35

## 2018-02-02 RX ADMIN — Medication SCH TAB: at 08:21

## 2018-02-02 NOTE — PCM.PN
- General Info


Date of Service: 02/02/18


Admission Dx/Problem (Free Text): 





History:


She is progressing, can walk with walker but uses W/C also.  She has failed in 

general and [facility] thinks she no longer qualifies to live there, where one is 

required to be nearly self-sufficient.  Plans are to move to [place] where she 

can get more care, and where she has family, including a sister who with her 

 runs a handicapped transport business.





Exam:


-Mood seems good


-Heart regular with prematures


-Lungs clear


-Lying flat w/o dyspnea





Impression:


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


-Weakness from dehydration, imiproving


-Unable to back to previous living situation





Plan:


Remain on Swing Bed until suitable situation is found in [place].








- Patient Data


Vitals - Most Recent: 


 Last Vital Signs











Temp  36.5 C   02/02/18 06:00


 


Pulse  69   02/02/18 06:00


 


Resp  18   02/02/18 06:00


 


BP  92/56 L  02/02/18 06:00


 


Pulse Ox  95   02/02/18 06:00











Weight - Most Recent: 64.864 kg


I&O - Last 24 Hours: 


 Intake & Output











 02/01/18 02/02/18 02/02/18





 22:59 06:59 14:59


 


Intake Total   200


 


Balance   200











Med Orders - Current: 


 Current Medications





Acetaminophen (Tylenol)  650 mg PO Q6H PRN


   PRN Reason: Pain


   Last Admin: 01/26/18 20:04 Dose:  650 mg


Atorvastatin Calcium (Lipitor)  10 mg PO BEDTIME [facility]


   Last Admin: 02/01/18 19:29 Dose:  10 mg


Calcium Carbonate (Calcium Carbonate/Vitamin D 1250 Mg-200 Unit)  1 tab PO BID 

[facility]


   Last Admin: 02/02/18 08:21 Dose:  1 tab


Levothyroxine Sodium (Levothyroxine)  175 mcg PO ACBREAKFAST [facility]


   Last Admin: 02/02/18 06:10 Dose:  175 mcg


Lorazepam (Ativan)  1 mg PO ASDIRECTED PRN


   PRN Reason: Seizures


Lacosamide [Vimpat] (200mg (Own Supply))  0 mg PO BID [facility]


   Last Admin: 02/02/18 08:20 Dose:  200 mg


Lamotrigine [ Lamictal] 300mg (Own Supply)  0 mg PO BID [facility]


   Last Admin: 02/02/18 08:20 Dose:  300 mg


Multivitamin/Iron/Folic Acid [Sentry Tablet] Own Supply  1 tab PO DAILY [facility]


   Last Admin: 02/02/18 08:20 Dose:  1 tab


Raloxifene 60 Mg ( (Own Supply))  0 mg PO DAILY [facility]


   Last Admin: 02/02/18 08:20 Dose:  60 mg


Ranitidine [Zantac] (75mg (Own Supply))  0 mg PO DAILY@0700 [facility]


   Last Admin: 02/02/18 06:10 Dose:  75 mg


Senna/Docusate Sodium (Senna Plus)  1 tab PO BID [facility]


   Last Admin: 02/02/18 08:21 Dose:  1 tab


Sertraline HCl (Zoloft)  200 mg PO DAILY [facility]


   Last Admin: 02/02/18 08:21 Dose:  200 mg


Sertraline HCl (Zoloft)  50 mg PO DAILY [facility]


   Last Admin: 02/02/18 08:22 Dose:  50 mg





Discontinued Medications





Ranitidine [Zantac] (75mg (Own Supply))  0 mg PO DAILY [facility]


   Last Admin: 01/29/18 08:25 Dose:  75 mg











- Problem List Review


Problem List Initiated/Reviewed/Updated: Yes

## 2018-02-03 RX ADMIN — Medication SCH MG: at 07:47

## 2018-02-03 RX ADMIN — Medication SCH MG: at 19:32

## 2018-02-03 RX ADMIN — Medication SCH TAB: at 19:32

## 2018-02-03 RX ADMIN — Medication SCH TAB: at 07:46

## 2018-02-03 RX ADMIN — Medication SCH MG: at 07:48

## 2018-02-03 RX ADMIN — Medication SCH MG: at 19:37

## 2018-02-03 RX ADMIN — SERTRALINE HYDROCHLORIDE SCH MG: 100 TABLET ORAL at 07:47

## 2018-02-03 RX ADMIN — SERTRALINE HYDROCHLORIDE SCH MG: 50 TABLET ORAL at 07:47

## 2018-02-04 RX ADMIN — Medication SCH MG: at 07:53

## 2018-02-04 RX ADMIN — Medication SCH TAB: at 07:53

## 2018-02-04 RX ADMIN — Medication SCH MG: at 19:41

## 2018-02-04 RX ADMIN — SERTRALINE HYDROCHLORIDE SCH MG: 50 TABLET ORAL at 07:54

## 2018-02-04 RX ADMIN — Medication SCH TAB: at 07:56

## 2018-02-04 RX ADMIN — SERTRALINE HYDROCHLORIDE SCH MG: 100 TABLET ORAL at 07:54

## 2018-02-04 RX ADMIN — Medication SCH MG: at 07:52

## 2018-02-04 RX ADMIN — Medication SCH TAB: at 19:41

## 2018-02-04 RX ADMIN — Medication SCH MG: at 07:55

## 2018-02-05 RX ADMIN — Medication SCH TAB: at 08:04

## 2018-02-05 RX ADMIN — SERTRALINE HYDROCHLORIDE SCH MG: 100 TABLET ORAL at 08:01

## 2018-02-05 RX ADMIN — Medication SCH MG: at 08:03

## 2018-02-05 RX ADMIN — Medication SCH MG: at 20:27

## 2018-02-05 RX ADMIN — Medication SCH TAB: at 20:27

## 2018-02-05 RX ADMIN — SERTRALINE HYDROCHLORIDE SCH MG: 50 TABLET ORAL at 08:02

## 2018-02-05 RX ADMIN — Medication SCH MG: at 08:00

## 2018-02-05 RX ADMIN — Medication SCH TAB: at 08:01

## 2018-02-06 RX ADMIN — SERTRALINE HYDROCHLORIDE SCH MG: 100 TABLET ORAL at 08:14

## 2018-02-06 RX ADMIN — Medication SCH MG: at 20:50

## 2018-02-06 RX ADMIN — Medication SCH MG: at 20:49

## 2018-02-06 RX ADMIN — SERTRALINE HYDROCHLORIDE SCH MG: 50 TABLET ORAL at 08:14

## 2018-02-06 RX ADMIN — Medication SCH TAB: at 20:50

## 2018-02-06 RX ADMIN — Medication SCH TAB: at 08:12

## 2018-02-06 RX ADMIN — Medication SCH MG: at 08:11

## 2018-02-06 RX ADMIN — Medication SCH MG: at 08:09

## 2018-02-06 RX ADMIN — Medication SCH MG: at 08:12

## 2018-02-06 RX ADMIN — Medication SCH TAB: at 08:09

## 2018-02-07 RX ADMIN — Medication SCH TAB: at 09:08

## 2018-02-07 RX ADMIN — SERTRALINE HYDROCHLORIDE SCH MG: 50 TABLET ORAL at 09:08

## 2018-02-07 RX ADMIN — Medication SCH MG: at 20:10

## 2018-02-07 RX ADMIN — Medication SCH MG: at 09:07

## 2018-02-07 RX ADMIN — SERTRALINE HYDROCHLORIDE SCH MG: 100 TABLET ORAL at 09:08

## 2018-02-07 RX ADMIN — Medication SCH MG: at 09:09

## 2018-02-07 RX ADMIN — Medication SCH MG: at 09:08

## 2018-02-07 RX ADMIN — Medication SCH TAB: at 09:09

## 2018-02-07 RX ADMIN — Medication SCH MG: at 20:09

## 2018-02-07 RX ADMIN — Medication SCH TAB: at 20:10

## 2018-02-08 RX ADMIN — Medication SCH MG: at 08:25

## 2018-02-08 RX ADMIN — Medication SCH MG: at 08:24

## 2018-02-08 RX ADMIN — Medication SCH TAB: at 20:44

## 2018-02-08 RX ADMIN — Medication SCH MG: at 20:44

## 2018-02-08 RX ADMIN — Medication SCH TAB: at 08:24

## 2018-02-08 RX ADMIN — Medication SCH MG: at 20:45

## 2018-02-08 RX ADMIN — Medication SCH TAB: at 08:21

## 2018-02-08 RX ADMIN — Medication SCH MG: at 08:26

## 2018-02-08 RX ADMIN — SERTRALINE HYDROCHLORIDE SCH MG: 100 TABLET ORAL at 08:25

## 2018-02-08 RX ADMIN — SERTRALINE HYDROCHLORIDE SCH MG: 50 TABLET ORAL at 08:25

## 2018-02-09 RX ADMIN — SERTRALINE HYDROCHLORIDE SCH MG: 50 TABLET ORAL at 08:46

## 2018-02-09 RX ADMIN — Medication SCH TAB: at 08:44

## 2018-02-09 RX ADMIN — Medication SCH MG: at 21:57

## 2018-02-09 RX ADMIN — Medication SCH MG: at 08:44

## 2018-02-09 RX ADMIN — Medication SCH MG: at 08:45

## 2018-02-09 RX ADMIN — Medication SCH TAB: at 21:54

## 2018-02-09 RX ADMIN — SERTRALINE HYDROCHLORIDE SCH MG: 100 TABLET ORAL at 08:45

## 2018-02-09 RX ADMIN — Medication SCH MG: at 21:56

## 2018-02-10 RX ADMIN — Medication SCH TAB: at 09:10

## 2018-02-10 RX ADMIN — Medication SCH MG: at 19:23

## 2018-02-10 RX ADMIN — SERTRALINE HYDROCHLORIDE SCH MG: 50 TABLET ORAL at 09:12

## 2018-02-10 RX ADMIN — Medication SCH TAB: at 09:11

## 2018-02-10 RX ADMIN — Medication SCH MG: at 09:10

## 2018-02-10 RX ADMIN — SERTRALINE HYDROCHLORIDE SCH MG: 100 TABLET ORAL at 09:12

## 2018-02-10 RX ADMIN — Medication SCH TAB: at 19:23

## 2018-02-10 RX ADMIN — Medication SCH MG: at 09:11

## 2018-02-10 RX ADMIN — Medication SCH MG: at 19:25

## 2018-02-11 RX ADMIN — SERTRALINE HYDROCHLORIDE SCH MG: 100 TABLET ORAL at 07:46

## 2018-02-11 RX ADMIN — Medication SCH TAB: at 07:57

## 2018-02-11 RX ADMIN — Medication SCH MG: at 07:45

## 2018-02-11 RX ADMIN — Medication SCH TAB: at 20:10

## 2018-02-11 RX ADMIN — SERTRALINE HYDROCHLORIDE SCH MG: 50 TABLET ORAL at 07:46

## 2018-02-11 RX ADMIN — Medication SCH MG: at 07:48

## 2018-02-11 RX ADMIN — Medication SCH MG: at 20:11

## 2018-02-11 RX ADMIN — Medication SCH MG: at 20:12

## 2018-02-11 RX ADMIN — Medication SCH MG: at 07:47

## 2018-02-11 RX ADMIN — Medication SCH TAB: at 07:45

## 2018-02-12 VITALS — SYSTOLIC BLOOD PRESSURE: 97 MMHG | DIASTOLIC BLOOD PRESSURE: 59 MMHG

## 2018-02-12 RX ADMIN — Medication SCH MG: at 08:43

## 2018-02-12 RX ADMIN — Medication SCH MG: at 08:49

## 2018-02-12 RX ADMIN — Medication SCH TAB: at 08:42

## 2018-02-12 RX ADMIN — Medication SCH MG: at 08:44

## 2018-02-12 RX ADMIN — SERTRALINE HYDROCHLORIDE SCH MG: 50 TABLET ORAL at 08:44

## 2018-02-12 RX ADMIN — Medication SCH TAB: at 08:50

## 2018-02-12 RX ADMIN — SERTRALINE HYDROCHLORIDE SCH MG: 100 TABLET ORAL at 08:44

## 2018-02-12 NOTE — PCM.DCSUM1
**Discharge Summary





- Hospital Course


Free Text/Narrative:: 





Final Diagnosis:


-Weakness secondary to dehydration, improved


-Recurrent nausea and vomiting, improved


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





Secondary Diagnoses:


-Panhypopituitarism


-Complex partial seizure disorder


-Mood disorder, improved on Zoloft


-Osteoporosis


-Hyperlipidemia


-Former smoker, never heavy, quit in 11


-Mild hyponatremia





Reason for Admission:


Admitted to Acute Care after a spell of severe weakness, nausea, vomiting.  Had 

previous spells of this also, usually improved.  After her Acute Care stay, she 

was unable to go back to [email or web address] so was transferred to Swing Bed





Initial findings:


None different than her usual with R hemiparesis, alopecia, mild dysarthria, 

but now has mild hyponatremia also.





Treatment and Course in Hospital:


She remained in Swing Bed and gradually regained strength and did not have 

further nausea and vomiting.  No seizures during her stay.





Condition on Discharge:


-R hemiparesis


-Mild dysarthria, but alert and lucid


-Heart sounds normal and regular; no ankle edema


-Lungs clear, lying flat without dyspnea





Discharge Plan:


An opening has been found at [facility] in [place], good for her because she 

has a sister living in [place].  Along with her Admission H&P, I have enclosed 

her last outpatient Annual Physical and Hx going back to 01.








- Discharge Data


Discharge Date: 02/12/18


Discharge Disposition: DC/[facility]fer to [facility]


Condition: Good





- Patient Summary/Data


Consults: 


 Consultations





01/25/18 13:06


OT Evaluation and Treatment [CONS] Routine 


PT Evaluation and Treatment [CONS] Routine 














- Discharge Plan


Home Medications: 


 Home Meds





LORazepam 1 mg PO ASDIRECTED PRN 03/23/14 [History]


Levothyroxine 175 mcg PO ACBREAKFAST 03/23/14 [History]


Multivitamin/Iron/Folic Acid [Sentry Tablet] 1 tab PO DAILY 03/23/14 [History]


atorvaSTATin [Lipitor] 10 mg PO BEDTIME 03/23/14 [History]


lamoTRIgine [Lamictal] 300 mg PO BID 02/05/16 [History]


Lacosamide [Vimpat] 200 mg PO BID 03/30/16 [Rx]


Calcium Carbonate/Vitamin D3 [Calcium 600 + Vit D 200] 1 tab PO BID 01/23/18 [

History]


Raloxifene [Evista] 60 mg PO DAILY 01/23/18 [History]


Ranitidine [Zantac] 75 mg PO DAILY 01/23/18 [History]


Sertraline HCl [Zoloft] 250 mg PO DAILY 01/23/18 [History]


Acetaminophen [Tylenol] 650 mg PO Q6H PRN  tablet 02/12/18 [Rx]











- Patient Data


Vitals - Most Recent: 


 Last Vital Signs











Temp  36.8 C   02/12/18 05:56


 


Pulse  74   02/12/18 05:56


 


Resp  18   02/12/18 05:56


 


BP  97/59 L  02/12/18 05:56


 


Pulse Ox  99   02/12/18 05:56











Weight - Most Recent: 64.864 kg


I&O - Last 24 hours: 


 Intake & Output











 02/11/18 02/12/18 02/12/18





 22:59 06:59 14:59


 


Intake Total   240


 


Balance   240











Med Orders - Current: 


 Current Medications





Acetaminophen (Tylenol)  650 mg PO Q6H PRN


   PRN Reason: Pain


   Last Admin: 02/11/18 20:10 Dose:  650 mg


Atorvastatin Calcium (Lipitor)  10 mg PO BEDTIME [facility]


   Last Admin: 02/11/18 20:12 Dose:  10 mg


Calcium Carbonate (Calcium Carbonate/Vitamin D 1250 Mg-200 Unit)  1 tab PO BID 

[facility]


   Last Admin: 02/12/18 08:42 Dose:  1 tab


Famotidine (Pepcid)  20 mg PO DAILY@0700 [facility]


   Last Admin: 02/12/18 06:27 Dose:  20 mg


Levothyroxine Sodium (Levothyroxine)  175 mcg PO ACBREAKFAST [facility]


   Last Admin: 02/12/18 06:27 Dose:  175 mcg


Lorazepam (Ativan)  1 mg PO ASDIRECTED PRN


   PRN Reason: Seizures


Lacosamide [Vimpat] (200mg (Own Supply))  0 mg PO BID [facility]


   Last Admin: 02/12/18 08:49 Dose:  200 mg


Multivitamin/Iron/Folic Acid [Sentry Tablet] Own Supply  1 tab PO DAILY [facility]


   Last Admin: 02/12/18 08:50 Dose:  1 tab


Raloxifene 60 Mg ( (Own Supply))  0 mg PO DAILY [facility]


   Last Admin: 02/12/18 08:44 Dose:  60 mg


Lamotrigine [ Lamictal] 200mg Tab (Own Supply)  0 mg PO BID [facility]


   Last Admin: 02/12/18 08:43 Dose:  300 mg


Ondansetron HCl (Zofran Odt)  4 mg PO Q6H PRN


   PRN Reason: Nausea/Vomiting


Senna/Docusate Sodium (Senna Plus)  1 tab PO BID [facility]


   Last Admin: 02/12/18 08:42 Dose:  1 tab


Sertraline HCl (Zoloft)  200 mg PO DAILY [facility]


   Last Admin: 02/12/18 08:44 Dose:  200 mg


Sertraline HCl (Zoloft)  50 mg PO DAILY [facility]


   Last Admin: 02/12/18 08:44 Dose:  50 mg





Discontinued Medications





Lamotrigine [ Lamictal] 300mg (Own Supply)  0 mg PO BID [facility]


   Last Admin: 02/09/18 08:45 Dose:  300 mg


Ranitidine [Zantac] (75mg (Own Supply))  0 mg PO DAILY [facility]


   Last Admin: 01/29/18 08:25 Dose:  75 mg


Ranitidine [Zantac] (75mg (Own Supply))  0 mg PO DAILY@0700 [facility]


   Last Admin: 02/02/18 06:10 Dose:  75 mg











*Q Meaningful Use (DIS)





- VTE *Q


VTE Criteria *Q: 








- Stroke *Q


Stroke Criteria *Q: 








- AMI *Q


AMI Criteria *Q: